# Patient Record
Sex: MALE | Race: WHITE | NOT HISPANIC OR LATINO | Employment: OTHER | URBAN - METROPOLITAN AREA
[De-identification: names, ages, dates, MRNs, and addresses within clinical notes are randomized per-mention and may not be internally consistent; named-entity substitution may affect disease eponyms.]

---

## 2021-02-18 ENCOUNTER — TELEPHONE (OUTPATIENT)
Dept: FAMILY MEDICINE CLINIC | Facility: CLINIC | Age: 61
End: 2021-02-18

## 2021-02-18 NOTE — TELEPHONE ENCOUNTER
PATIENT HAS A NEW PATIENT APPOINTMENT WITH DOCTOR EUGENE ON 04/08/2021 THE PATIENT ASKED TO BE ADDED TO THE WAIT LIST. I ADDED THE PATIENT TO THE WAIT LIST HOWEVER THE PATIENT STATES THAT IF THERE IS A CANCELLATION HE WOULD NEED TO BE NOTIFIED THE DAY BEFORE THE APPOINTMENT DURING BUSINESS HOURS SO HE CAN ARRANGE TRANSPORTATION.

## 2021-03-12 ENCOUNTER — TELEPHONE (OUTPATIENT)
Dept: FAMILY MEDICINE CLINIC | Facility: CLINIC | Age: 61
End: 2021-03-12

## 2021-03-12 NOTE — TELEPHONE ENCOUNTER
Caller: [facility] - [place], [place] - [address] - [phone] [facility] [phone] FX    Relationship: Pharmacy    Best call back number: [phone]    Medication needed:   PANTOPRAZOLE 40MG  When do you need the refill by: ASAP    What details did the patient provide when requesting the medication:     Does the patient have less than a 3 day supply:  [] Yes  [x] No    What is the patient's preferred pharmacy: [facility] - [place], [place] - [address] - [phone] [facility] [phone] FX

## 2021-03-12 NOTE — TELEPHONE ENCOUNTER
Informed [facility] that we have not seen this patient in office and are unable to refill his medication until we do see him.

## 2021-04-08 ENCOUNTER — OFFICE VISIT (OUTPATIENT)
Dept: FAMILY MEDICINE CLINIC | Facility: CLINIC | Age: 61
End: 2021-04-08

## 2021-04-08 VITALS
DIASTOLIC BLOOD PRESSURE: 88 MMHG | RESPIRATION RATE: 15 BRPM | BODY MASS INDEX: 28.75 KG/M2 | OXYGEN SATURATION: 98 % | WEIGHT: 183.2 LBS | SYSTOLIC BLOOD PRESSURE: 126 MMHG | HEIGHT: 67 IN | HEART RATE: 71 BPM

## 2021-04-08 DIAGNOSIS — M25.512 CHRONIC PAIN OF BOTH SHOULDERS: ICD-10-CM

## 2021-04-08 DIAGNOSIS — Z87.891 HISTORY OF TOBACCO ABUSE: ICD-10-CM

## 2021-04-08 DIAGNOSIS — G40.909 SEIZURE DISORDER (HCC): Primary | ICD-10-CM

## 2021-04-08 DIAGNOSIS — J44.9 CHRONIC OBSTRUCTIVE PULMONARY DISEASE, UNSPECIFIED COPD TYPE (HCC): ICD-10-CM

## 2021-04-08 DIAGNOSIS — F10.10 ALCOHOL ABUSE: ICD-10-CM

## 2021-04-08 DIAGNOSIS — B36.9 FUNGAL DERMATITIS: ICD-10-CM

## 2021-04-08 DIAGNOSIS — K21.9 GASTROESOPHAGEAL REFLUX DISEASE, UNSPECIFIED WHETHER ESOPHAGITIS PRESENT: ICD-10-CM

## 2021-04-08 DIAGNOSIS — G89.29 CHRONIC PAIN OF BOTH SHOULDERS: ICD-10-CM

## 2021-04-08 DIAGNOSIS — M25.511 CHRONIC PAIN OF BOTH SHOULDERS: ICD-10-CM

## 2021-04-08 PROCEDURE — 99204 OFFICE O/P NEW MOD 45 MIN: CPT | Performed by: FAMILY MEDICINE

## 2021-04-08 RX ORDER — METHION/INOS/CHOL BT/B COM/LIV 110MG-86MG
100 CAPSULE ORAL DAILY
Qty: 90 TABLET | Refills: 3 | Status: SHIPPED | OUTPATIENT
Start: 2021-04-08 | End: 2021-04-08

## 2021-04-08 RX ORDER — PANTOPRAZOLE SODIUM 40 MG/1
40 TABLET, DELAYED RELEASE ORAL DAILY
COMMUNITY

## 2021-04-08 RX ORDER — FOLIC ACID 1 MG/1
1 TABLET ORAL DAILY
Qty: 90 TABLET | Refills: 3 | Status: SHIPPED | OUTPATIENT
Start: 2021-04-08

## 2021-04-08 RX ORDER — FOLIC ACID 1 MG/1
1 TABLET ORAL DAILY
COMMUNITY
End: 2021-04-08 | Stop reason: SDUPTHER

## 2021-04-08 RX ORDER — KETOCONAZOLE 20 MG/G
CREAM TOPICAL DAILY
Qty: 60 G | Refills: 5 | Status: SHIPPED | OUTPATIENT
Start: 2021-04-08 | End: 2021-10-06 | Stop reason: SDUPTHER

## 2021-04-08 RX ORDER — LEVETIRACETAM 1000 MG/1
1000 TABLET ORAL 2 TIMES DAILY
COMMUNITY

## 2021-04-08 RX ORDER — ALBUTEROL SULFATE 90 UG/1
2 AEROSOL, METERED RESPIRATORY (INHALATION) EVERY 4 HOURS PRN
COMMUNITY

## 2021-04-08 RX ORDER — METHION/INOS/CHOL BT/B COM/LIV 110MG-86MG
100 CAPSULE ORAL DAILY
Qty: 90 TABLET | Refills: 3 | Status: SHIPPED | OUTPATIENT
Start: 2021-04-08
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
## 2021-04-09 ENCOUNTER — TELEPHONE (OUTPATIENT)
Dept: FAMILY MEDICINE CLINIC | Facility: CLINIC | Age: 61
End: 2021-04-09

## 2021-04-09 NOTE — TELEPHONE ENCOUNTER
Caller: Parminder Mehta    Relationship: Self    Best call back number: [phone]     What was the call regarding: PATIENT CALLED TO CHECK THE STATUS OF A PRESCRIPTION FOR A RASH THAT WAS SUPPOSE TO BE SENT TO THE PHARMACY YESTERDAY,     Do you require a callback: YES      PHARMACY VERIFIED [facility] [address] [place] [address] [phone] [facility] [phone]

## 2021-04-16 ENCOUNTER — TELEPHONE (OUTPATIENT)
Dept: FAMILY MEDICINE CLINIC | Facility: CLINIC | Age: 61
End: 2021-04-16

## 2021-04-20 ENCOUNTER — OFFICE VISIT (OUTPATIENT)
Dept: FAMILY MEDICINE CLINIC | Facility: CLINIC | Age: 61
End: 2021-04-20

## 2021-04-20 VITALS
BODY MASS INDEX: 29.1 KG/M2 | SYSTOLIC BLOOD PRESSURE: 112 MMHG | TEMPERATURE: 97 F | HEIGHT: 67 IN | HEART RATE: 84 BPM | OXYGEN SATURATION: 98 % | WEIGHT: 185.4 LBS | DIASTOLIC BLOOD PRESSURE: 74 MMHG | RESPIRATION RATE: 16 BRPM

## 2021-04-20 DIAGNOSIS — R21 RASH: Primary | ICD-10-CM

## 2021-04-20 PROCEDURE — 99213 OFFICE O/P EST LOW 20 MIN: CPT | Performed by: PHYSICIAN ASSISTANT

## 2021-04-20 RX ORDER — CLOTRIMAZOLE AND BETAMETHASONE DIPROPIONATE 10; .64 MG/G; MG/G
CREAM TOPICAL 2 TIMES DAILY
Qty: 30 G | Refills: 1 | Status: SHIPPED | OUTPATIENT
Start: 2021-04-20 | End: 2021-10-06 | Stop reason: SDUPTHER

## 2021-04-20 RX ORDER — METHYLPREDNISOLONE 4 MG/1
TABLET ORAL
Qty: 21 TABLET | Refills: 1 | Status: SHIPPED | OUTPATIENT
Start: 2021-04-20 | End: 2021-10-07 | Stop reason: SDUPTHER

## 2021-04-20 NOTE — PROGRESS NOTES
Subjective   Parminder Mehta is a 60 y.o. male  Rash (Started on shoulders six weeks ago, spread to chest, extremities. Occ itches, treating with OTC topicals. Ketoconazole not effective)      History of Present Illness  Patient is a 60-year-old male who comes in plan of rash on the shoulder between shoulder blades itching irritated has been using ketoconazole without any relief patient's rash is itchy irritated between shoulder blades  The following portions of the patient's history were reviewed and updated as appropriate: allergies, current medications, past social history and problem list    Review of Systems   Constitutional: Negative for fatigue and unexpected weight change.   Respiratory: Negative for cough, chest tightness and shortness of breath.    Cardiovascular: Negative for chest pain, palpitations and leg swelling.   Gastrointestinal: Negative for nausea.   Skin: Negative for color change and rash.   Neurological: Negative for dizziness, syncope, weakness and headaches.       Objective     Vitals:    04/20/21 1043   BP: 112/74   Pulse: 84   Resp: 16   Temp: 97 °F (36.1 °C)   SpO2: 98%       Physical Exam  Vitals and nursing note reviewed.   Constitutional:       Appearance: He is well-developed.   Neck:      Vascular: No JVD.   Cardiovascular:      Rate and Rhythm: Normal rate and regular rhythm.      Pulses: Normal pulses.      Heart sounds: Normal heart sounds. No murmur heard.     Pulmonary:      Effort: Pulmonary effort is normal. No respiratory distress.      Breath sounds: Normal breath sounds.   Abdominal:      General: Bowel sounds are normal.      Palpations: Abdomen is soft.      Tenderness: There is no abdominal tenderness.   Skin:     General: Skin is warm and dry.                Assessment/Plan     Diagnoses and all orders for this visit:    1. Rash (Primary)  -     methylPREDNISolone (MEDROL) 4 MG dose pack; Take as directed on package instructions.  Dispense: 21 tablet; Refill: 1  -      clotrimazole-betamethasone (Lotrisone) 1-0.05 % cream; Apply  topically to the appropriate area as directed 2 (Two) Times a Day.  Dispense: 30 g; Refill: 1    Follow-up if no better

## 2021-06-23 ENCOUNTER — TELEPHONE (OUTPATIENT)
Dept: FAMILY MEDICINE CLINIC | Facility: CLINIC | Age: 61
End: 2021-06-23

## 2021-06-23 NOTE — TELEPHONE ENCOUNTER
Caller: Parminder Mehta    Relationship to patient: Self    Best call back number:  [phone]    Chief complaint: COUGH, FEVER, SHORTNESS OF BREATH AT REST     Patient directed to call 911 or go to their nearest emergency room.     Patient verbalized understanding: [x] Yes  [] No  If no, why?    Additional notes: PATIENT CALLED AND WANTED TO CANCEL HIS APPOINTMENT FOR A PHYSICAL TOMORROW ON 6-24-21. HE WANTED TO KNOW IF YOU COULD GET COVID 19 EVEN AFTER YOU HAVE BEEN VACCINATED. HE STATED HE HAD A FEVER, COUGH AND SHORTNESS OF BREATH. I ASK THE PATIENT IF THE SHORTNESS OF BREATH WAS AT REST AND HE STATED IT WAS ALL THE TIME AND HE HAD THESE SYMPTONS FOR 3 DAYS. I ADVISED THE PATIENT TO GO TO THE ER OR CALL 911. HE STATED HE WOULD GO TODAY.

## 2021-10-06 DIAGNOSIS — B36.9 FUNGAL DERMATITIS: ICD-10-CM

## 2021-10-06 DIAGNOSIS — R21 RASH: ICD-10-CM

## 2021-10-06 RX ORDER — CLOTRIMAZOLE AND BETAMETHASONE DIPROPIONATE 10; .64 MG/G; MG/G
CREAM TOPICAL 2 TIMES DAILY
Qty: 30 G | Refills: 0 | Status: SHIPPED | OUTPATIENT
Start: 2021-10-06

## 2021-10-06 RX ORDER — KETOCONAZOLE 20 MG/G
CREAM TOPICAL DAILY
Qty: 60 G | Refills: 0 | Status: SHIPPED | OUTPATIENT
Start: 2021-10-06

## 2021-10-06 RX ORDER — METHYLPREDNISOLONE 4 MG/1
TABLET ORAL
Qty: 21 TABLET | Refills: 1 | OUTPATIENT
Start: 2021-10-06

## 2021-10-06 NOTE — TELEPHONE ENCOUNTER
Caller: Parminder Mehta    Relationship: Self      Medication requested (name and dosage):    [facility] [address] [place] [address] [phone] [facility] [phone]           Pharmacy where request should be sent:   clotrimazole-betamethasone   ketoconazole (  AND  methylPREDNISolone (MEDROL) 4    Additional details provided by patient:   PT STATED THAT HE WAS PRESCRIBED THESE MEDICATIONS FOR A RASH THAT HE HAS ON SHOULD AND FIST AND STATED THAT RASH HAS CAME BACK    Best call back number: [phone]  Does the patient have less than a 3 day supply:  [x] Yes  [] No    Donny Parr Rep   10/06/21 14:57 EDT

## 2021-10-07 DIAGNOSIS — R21 RASH: ICD-10-CM

## 2021-10-07 RX ORDER — METHYLPREDNISOLONE 4 MG/1
TABLET ORAL
Qty: 21 TABLET | Refills: 1 | Status: SHIPPED | OUTPATIENT
Start: 2021-10-07

## 2021-10-07 NOTE — TELEPHONE ENCOUNTER
Pt states that the ketoconazole shampoo does not work for him and that he would like for the medrol 4 mg sent in . Pt states that it worked for him the last time.

## 2021-10-07 NOTE — TELEPHONE ENCOUNTER
PATIENT CALLED BACK REGARDING HIS MED REQUEST. A ketoconazole WAS CALLED IN BUT HE DOESN'T NEED THAT ONE, BECAUSE IT DOESN'T WORK. HE IS ASKING FOR THE methylPREDNISolone (MEDROL) 4MG BECAUSE THAT IS WHAT WORKED THE LAST TIME ON 04-20-21. PATIENT MAY NEED A CALL BACK TO DISCUSS [phone].

## 2021-10-07 NOTE — TELEPHONE ENCOUNTER
PT STATED THAT HE WAS PRESCRIBED THESE MEDICATIONS FOR A RASH THAT HE HAS ON SHOULDER AND FIST. HE STATED THAT RASH HAS CAME BACK. HE WOULD LIKE FOR MEDROL 4MG TO BE SENT IN INSTEAD OF THE KETOCONAZOLE SHAMPOO BECAUSE THAT IS WHAT WORKED LAST TIME.

## 2022-06-13 ENCOUNTER — READMISSION MANAGEMENT (OUTPATIENT)
Dept: CALL CENTER | Facility: HOSPITAL | Age: 62
End: 2022-06-13

## 2022-06-13 NOTE — OUTREACH NOTE
Prep Survey    Flowsheet Row Responses   [facility] facility patient discharged from? Non-BH   Is LACE score < 7 ? Non-BH Discharge   Emergency Room discharge w/ pulse ox? No   Eligibility [facility]    Date of Discharge 06/13/22   Discharge Disposition Home or Self Care   Discharge diagnosis Unavailable    Does the patient have one of the following disease processes/diagnoses(primary or secondary)? Other   Prep survey completed? Yes          YAMEL PEREZ - Registered Nurse

## 2022-06-14 ENCOUNTER — TRANSITIONAL CARE MANAGEMENT TELEPHONE ENCOUNTER (OUTPATIENT)
Dept: CALL CENTER | Facility: HOSPITAL | Age: 62
End: 2022-06-14

## 2022-06-14 NOTE — OUTREACH NOTE
Call Center TCM Note    Flowsheet Row Responses   [facility] patient discharged from? Non-BH   Does the patient have one of the following disease processes/diagnoses(primary or secondary)? Other   TCM attempt successful? No   Unsuccessful attempts Attempt 2          Chelsy Sofia MA    6/14/2022, 16:00 EDT

## 2022-06-14 NOTE — OUTREACH NOTE
Call Center TCM Note    Flowsheet Row Responses   [facility] patient discharged from? Non-BH   Does the patient have one of the following disease processes/diagnoses(primary or secondary)? Other   TCM attempt successful? No   Unsuccessful attempts Attempt 1   Call Status Left message          Chelsy Sofia MA    6/14/2022, 12:27 EDT

## 2022-06-15 ENCOUNTER — TRANSITIONAL CARE MANAGEMENT TELEPHONE ENCOUNTER (OUTPATIENT)
Dept: CALL CENTER | Facility: HOSPITAL | Age: 62
End: 2022-06-15

## 2022-06-15 NOTE — OUTREACH NOTE
Call Center TCM Note    Flowsheet Row Responses   [facility] patient discharged from? Non-BH   Does the patient have one of the following disease processes/diagnoses(primary or secondary)? Other   TCM attempt successful? No   Unsuccessful attempts Attempt 3   Wrap up additional comments Unable to reach pt x 3 attempts for TCM call. Pt is not yet sched for TCM APPT with PCP Dr Sow. Pt d/c date from [facility] was 06/13/2022. FYI for PCP, at time of this documentation, there are no notes scanned from [facility] Nanette Sofia MA    6/15/2022, 16:01 EDT

## 2023-09-19 ENCOUNTER — READMISSION MANAGEMENT (OUTPATIENT)
Dept: CALL CENTER | Facility: HOSPITAL | Age: 63
End: 2023-09-19
Payer: MEDICARE

## 2023-09-19 NOTE — OUTREACH NOTE
Prep Survey      Flowsheet Row Responses   [facility] facility patient discharged from? Non-BH   Is LACE score < 7 ? Non-BH Discharge   Eligibility [facility]   Date of Admission 09/13/23   Date of Discharge 09/18/23   Discharge diagnosis Diverticulitis   Does the patient have one of the following disease processes/diagnoses(primary or secondary)? Other   Prep survey completed? Yes            Miley PEREZ - Registered Nurse

## 2023-09-20 ENCOUNTER — TRANSITIONAL CARE MANAGEMENT TELEPHONE ENCOUNTER (OUTPATIENT)
Dept: CALL CENTER | Facility: HOSPITAL | Age: 63
End: 2023-09-20
Payer: MEDICARE

## 2023-09-20 NOTE — OUTREACH NOTE
Call Center TCM Note      Flowsheet Row Responses   [facility] facility patient discharged from? Non-  []   Does the patient have one of the following disease processes/diagnoses(primary or secondary)? Other   TCM attempt successful? No   Unsuccessful attempts Attempt 1   Revoked Reason Other  [Does not see an [facility] PCP any longer.]            Caridad Cardenas RN    9/20/2023, 09:09 EDT